# Patient Record
Sex: FEMALE | Race: WHITE | HISPANIC OR LATINO
[De-identification: names, ages, dates, MRNs, and addresses within clinical notes are randomized per-mention and may not be internally consistent; named-entity substitution may affect disease eponyms.]

---

## 2018-08-06 ENCOUNTER — APPOINTMENT (OUTPATIENT)
Dept: PULMONOLOGY | Facility: CLINIC | Age: 63
End: 2018-08-06

## 2019-01-08 ENCOUNTER — APPOINTMENT (OUTPATIENT)
Dept: HEART AND VASCULAR | Facility: CLINIC | Age: 64
End: 2019-01-08

## 2020-05-18 ENCOUNTER — LABORATORY RESULT (OUTPATIENT)
Age: 65
End: 2020-05-18

## 2020-05-18 ENCOUNTER — APPOINTMENT (OUTPATIENT)
Dept: HEART AND VASCULAR | Facility: CLINIC | Age: 65
End: 2020-05-18
Payer: COMMERCIAL

## 2020-05-18 VITALS
TEMPERATURE: 98 F | HEIGHT: 65 IN | BODY MASS INDEX: 29.16 KG/M2 | OXYGEN SATURATION: 98 % | HEART RATE: 72 BPM | DIASTOLIC BLOOD PRESSURE: 85 MMHG | SYSTOLIC BLOOD PRESSURE: 136 MMHG | WEIGHT: 175 LBS | RESPIRATION RATE: 14 BRPM

## 2020-05-18 DIAGNOSIS — Z87.898 PERSONAL HISTORY OF OTHER SPECIFIED CONDITIONS: ICD-10-CM

## 2020-05-18 DIAGNOSIS — I10 ESSENTIAL (PRIMARY) HYPERTENSION: ICD-10-CM

## 2020-05-18 DIAGNOSIS — E03.9 HYPOTHYROIDISM, UNSPECIFIED: ICD-10-CM

## 2020-05-18 DIAGNOSIS — R06.00 DYSPNEA, UNSPECIFIED: ICD-10-CM

## 2020-05-18 PROCEDURE — 93000 ELECTROCARDIOGRAM COMPLETE: CPT

## 2020-05-18 PROCEDURE — 99203 OFFICE O/P NEW LOW 30 MIN: CPT

## 2020-05-19 ENCOUNTER — NON-APPOINTMENT (OUTPATIENT)
Age: 65
End: 2020-05-19

## 2020-05-19 PROBLEM — R06.00 DYSPNEA ON MINIMAL EXERTION: Status: ACTIVE | Noted: 2020-05-19

## 2020-05-19 PROBLEM — I10 BENIGN ESSENTIAL HYPERTENSION: Status: ACTIVE | Noted: 2020-05-19

## 2020-05-19 RX ORDER — MELOXICAM 15 MG/1
15 TABLET ORAL
Qty: 30 | Refills: 0 | Status: ACTIVE | COMMUNITY
Start: 2020-03-19

## 2020-05-19 RX ORDER — LOSARTAN POTASSIUM 50 MG/1
50 TABLET, FILM COATED ORAL
Qty: 30 | Refills: 0 | Status: ACTIVE | COMMUNITY
Start: 2020-03-19

## 2020-05-19 RX ORDER — LEVOTHYROXINE SODIUM 0.05 MG/1
50 TABLET ORAL
Qty: 30 | Refills: 0 | Status: DISCONTINUED | COMMUNITY
Start: 2020-01-27

## 2020-05-19 NOTE — HISTORY OF PRESENT ILLNESS
[FreeTextEntry1] : Patient is 65 year old female with HTN, hypothyroidism who presents for cardiac eval. She reports no active chest pain at rest or with exertion (walking 20 mins or climbing 4 flight of stairs). She does admit to occasional shortness of breath with heavy exertion. In the past she has intermittent episodes of burning in chest at night attributed to heartburn. No dizziness, palpitations. No LOC.

## 2020-05-19 NOTE — PHYSICAL EXAM
[Normal Appearance] : normal appearance [General Appearance - Well Developed] : well developed [Well Groomed] : well groomed [No Deformities] : no deformities [General Appearance - Well Nourished] : well nourished [General Appearance - In No Acute Distress] : no acute distress [Normal Conjunctiva] : the conjunctiva exhibited no abnormalities [Eyelids - No Xanthelasma] : the eyelids demonstrated no xanthelasmas [No Oral Pallor] : no oral pallor [Normal Oral Mucosa] : normal oral mucosa [No Oral Cyanosis] : no oral cyanosis [Normal Jugular Venous V Waves Present] : normal jugular venous V waves present [Normal Jugular Venous A Waves Present] : normal jugular venous A waves present [No Jugular Venous Quesada A Waves] : no jugular venous quesada A waves [Heart Rate And Rhythm] : heart rate and rhythm were normal [Heart Sounds] : normal S1 and S2 [Murmurs] : no murmurs present [Respiration, Rhythm And Depth] : normal respiratory rhythm and effort [Exaggerated Use Of Accessory Muscles For Inspiration] : no accessory muscle use [Auscultation Breath Sounds / Voice Sounds] : lungs were clear to auscultation bilaterally [Abdomen Soft] : soft [Abdomen Tenderness] : non-tender [Abdomen Mass (___ Cm)] : no abdominal mass palpated [Abnormal Walk] : normal gait [Gait - Sufficient For Exercise Testing] : the gait was sufficient for exercise testing [Nail Clubbing] : no clubbing of the fingernails [Cyanosis, Localized] : no localized cyanosis [Petechial Hemorrhages (___cm)] : no petechial hemorrhages [Skin Color & Pigmentation] : normal skin color and pigmentation [] : no rash [No Venous Stasis] : no venous stasis [Skin Lesions] : no skin lesions [No Skin Ulcers] : no skin ulcer [No Xanthoma] : no  xanthoma was observed [Oriented To Time, Place, And Person] : oriented to person, place, and time [Mood] : the mood was normal [Affect] : the affect was normal [No Anxiety] : not feeling anxious

## 2020-05-19 NOTE — DISCUSSION/SUMMARY
[FreeTextEntry1] : Assessment/Plan:\par Patient is 65 year old female with HTN, hypothyroidism who presents for cardiac eval.\par \par -Doing well without active anginal equivalent at rest or exertion. Mild exertional SOB with heavy exertion possibly due to mild weight gain. EKG without ischemic changes. Check ECHO - EF, valvular abn\par \par -HTN: BP controlled. Cont losartan\par \par -Check CBC, CPM, lipids, TSH, Hgba1c\par \par Fu post echo

## 2020-06-07 ENCOUNTER — EMERGENCY (EMERGENCY)
Facility: HOSPITAL | Age: 65
LOS: 1 days | Discharge: HOME | End: 2020-06-07
Attending: EMERGENCY MEDICINE
Payer: COMMERCIAL

## 2020-06-07 VITALS
HEART RATE: 109 BPM | DIASTOLIC BLOOD PRESSURE: 124 MMHG | OXYGEN SATURATION: 97 % | TEMPERATURE: 99 F | HEIGHT: 62 IN | SYSTOLIC BLOOD PRESSURE: 172 MMHG | WEIGHT: 169.98 LBS | RESPIRATION RATE: 19 BRPM

## 2020-06-07 VITALS
OXYGEN SATURATION: 100 % | DIASTOLIC BLOOD PRESSURE: 80 MMHG | RESPIRATION RATE: 17 BRPM | SYSTOLIC BLOOD PRESSURE: 129 MMHG | HEART RATE: 70 BPM

## 2020-06-07 LAB
ALBUMIN SERPL ELPH-MCNC: 4.9 G/DL — SIGNIFICANT CHANGE UP (ref 3.3–5)
ALP SERPL-CCNC: 54 U/L — SIGNIFICANT CHANGE UP (ref 40–120)
ALT FLD-CCNC: 20 U/L — SIGNIFICANT CHANGE UP (ref 10–45)
ANION GAP SERPL CALC-SCNC: 13 MMOL/L — SIGNIFICANT CHANGE UP (ref 5–17)
APPEARANCE UR: CLEAR — SIGNIFICANT CHANGE UP
APTT BLD: 28.4 SEC — SIGNIFICANT CHANGE UP (ref 27.5–36.3)
AST SERPL-CCNC: 20 U/L — SIGNIFICANT CHANGE UP (ref 10–40)
BASOPHILS # BLD AUTO: 0.03 K/UL — SIGNIFICANT CHANGE UP (ref 0–0.2)
BASOPHILS NFR BLD AUTO: 0.4 % — SIGNIFICANT CHANGE UP (ref 0–2)
BILIRUB SERPL-MCNC: 0.3 MG/DL — SIGNIFICANT CHANGE UP (ref 0.2–1.2)
BILIRUB UR-MCNC: NEGATIVE — SIGNIFICANT CHANGE UP
BUN SERPL-MCNC: 16 MG/DL — SIGNIFICANT CHANGE UP (ref 7–23)
CALCIUM SERPL-MCNC: 9.9 MG/DL — SIGNIFICANT CHANGE UP (ref 8.4–10.5)
CHLORIDE SERPL-SCNC: 98 MMOL/L — SIGNIFICANT CHANGE UP (ref 96–108)
CO2 SERPL-SCNC: 23 MMOL/L — SIGNIFICANT CHANGE UP (ref 22–31)
COLOR SPEC: COLORLESS — SIGNIFICANT CHANGE UP
CREAT SERPL-MCNC: 0.76 MG/DL — SIGNIFICANT CHANGE UP (ref 0.5–1.3)
DIFF PNL FLD: NEGATIVE — SIGNIFICANT CHANGE UP
EOSINOPHIL # BLD AUTO: 0.07 K/UL — SIGNIFICANT CHANGE UP (ref 0–0.5)
EOSINOPHIL NFR BLD AUTO: 1 % — SIGNIFICANT CHANGE UP (ref 0–6)
GLUCOSE SERPL-MCNC: 132 MG/DL — HIGH (ref 70–99)
GLUCOSE UR QL: NEGATIVE — SIGNIFICANT CHANGE UP
HCT VFR BLD CALC: 43.1 % — SIGNIFICANT CHANGE UP (ref 34.5–45)
HGB BLD-MCNC: 14.1 G/DL — SIGNIFICANT CHANGE UP (ref 11.5–15.5)
IMM GRANULOCYTES NFR BLD AUTO: 0.7 % — SIGNIFICANT CHANGE UP (ref 0–1.5)
INR BLD: 0.97 RATIO — SIGNIFICANT CHANGE UP (ref 0.88–1.16)
KETONES UR-MCNC: NEGATIVE — SIGNIFICANT CHANGE UP
LEUKOCYTE ESTERASE UR-ACNC: NEGATIVE — SIGNIFICANT CHANGE UP
LYMPHOCYTES # BLD AUTO: 2.17 K/UL — SIGNIFICANT CHANGE UP (ref 1–3.3)
LYMPHOCYTES # BLD AUTO: 31.3 % — SIGNIFICANT CHANGE UP (ref 13–44)
MCHC RBC-ENTMCNC: 30.2 PG — SIGNIFICANT CHANGE UP (ref 27–34)
MCHC RBC-ENTMCNC: 32.7 GM/DL — SIGNIFICANT CHANGE UP (ref 32–36)
MCV RBC AUTO: 92.3 FL — SIGNIFICANT CHANGE UP (ref 80–100)
MONOCYTES # BLD AUTO: 0.62 K/UL — SIGNIFICANT CHANGE UP (ref 0–0.9)
MONOCYTES NFR BLD AUTO: 8.9 % — SIGNIFICANT CHANGE UP (ref 2–14)
NEUTROPHILS # BLD AUTO: 4 K/UL — SIGNIFICANT CHANGE UP (ref 1.8–7.4)
NEUTROPHILS NFR BLD AUTO: 57.7 % — SIGNIFICANT CHANGE UP (ref 43–77)
NITRITE UR-MCNC: NEGATIVE — SIGNIFICANT CHANGE UP
NRBC # BLD: 0 /100 WBCS — SIGNIFICANT CHANGE UP (ref 0–0)
PH UR: 6.5 — SIGNIFICANT CHANGE UP (ref 5–8)
PLATELET # BLD AUTO: 178 K/UL — SIGNIFICANT CHANGE UP (ref 150–400)
POTASSIUM SERPL-MCNC: 3.8 MMOL/L — SIGNIFICANT CHANGE UP (ref 3.5–5.3)
POTASSIUM SERPL-SCNC: 3.8 MMOL/L — SIGNIFICANT CHANGE UP (ref 3.5–5.3)
PROT SERPL-MCNC: 7.9 G/DL — SIGNIFICANT CHANGE UP (ref 6–8.3)
PROT UR-MCNC: NEGATIVE — SIGNIFICANT CHANGE UP
PROTHROM AB SERPL-ACNC: 11.1 SEC — SIGNIFICANT CHANGE UP (ref 10–12.9)
RBC # BLD: 4.67 M/UL — SIGNIFICANT CHANGE UP (ref 3.8–5.2)
RBC # FLD: 13.7 % — SIGNIFICANT CHANGE UP (ref 10.3–14.5)
SARS-COV-2 RNA SPEC QL NAA+PROBE: SIGNIFICANT CHANGE UP
SODIUM SERPL-SCNC: 134 MMOL/L — LOW (ref 135–145)
SP GR SPEC: 1.01 — SIGNIFICANT CHANGE UP (ref 1.01–1.02)
TROPONIN T, HIGH SENSITIVITY RESULT: <6 NG/L — SIGNIFICANT CHANGE UP (ref 0–51)
UROBILINOGEN FLD QL: NEGATIVE — SIGNIFICANT CHANGE UP
WBC # BLD: 6.94 K/UL — SIGNIFICANT CHANGE UP (ref 3.8–10.5)
WBC # FLD AUTO: 6.94 K/UL — SIGNIFICANT CHANGE UP (ref 3.8–10.5)

## 2020-06-07 PROCEDURE — 84484 ASSAY OF TROPONIN QUANT: CPT

## 2020-06-07 PROCEDURE — 70498 CT ANGIOGRAPHY NECK: CPT

## 2020-06-07 PROCEDURE — 70450 CT HEAD/BRAIN W/O DYE: CPT

## 2020-06-07 PROCEDURE — 81003 URINALYSIS AUTO W/O SCOPE: CPT

## 2020-06-07 PROCEDURE — 85610 PROTHROMBIN TIME: CPT

## 2020-06-07 PROCEDURE — 70498 CT ANGIOGRAPHY NECK: CPT | Mod: 26

## 2020-06-07 PROCEDURE — 85730 THROMBOPLASTIN TIME PARTIAL: CPT

## 2020-06-07 PROCEDURE — 70450 CT HEAD/BRAIN W/O DYE: CPT | Mod: 26,59

## 2020-06-07 PROCEDURE — 96375 TX/PRO/DX INJ NEW DRUG ADDON: CPT | Mod: XU

## 2020-06-07 PROCEDURE — 93005 ELECTROCARDIOGRAM TRACING: CPT

## 2020-06-07 PROCEDURE — 70496 CT ANGIOGRAPHY HEAD: CPT

## 2020-06-07 PROCEDURE — 99285 EMERGENCY DEPT VISIT HI MDM: CPT

## 2020-06-07 PROCEDURE — 70496 CT ANGIOGRAPHY HEAD: CPT | Mod: 26

## 2020-06-07 PROCEDURE — 85027 COMPLETE CBC AUTOMATED: CPT

## 2020-06-07 PROCEDURE — 71045 X-RAY EXAM CHEST 1 VIEW: CPT

## 2020-06-07 PROCEDURE — 82962 GLUCOSE BLOOD TEST: CPT

## 2020-06-07 PROCEDURE — 80053 COMPREHEN METABOLIC PANEL: CPT

## 2020-06-07 PROCEDURE — 96374 THER/PROPH/DIAG INJ IV PUSH: CPT | Mod: XU

## 2020-06-07 PROCEDURE — 71045 X-RAY EXAM CHEST 1 VIEW: CPT | Mod: 26,59

## 2020-06-07 PROCEDURE — 99284 EMERGENCY DEPT VISIT MOD MDM: CPT | Mod: 25

## 2020-06-07 RX ORDER — ACETAMINOPHEN 500 MG
975 TABLET ORAL ONCE
Refills: 0 | Status: COMPLETED | OUTPATIENT
Start: 2020-06-07 | End: 2020-06-07

## 2020-06-07 RX ORDER — ONDANSETRON 8 MG/1
4 TABLET, FILM COATED ORAL ONCE
Refills: 0 | Status: COMPLETED | OUTPATIENT
Start: 2020-06-07 | End: 2020-06-07

## 2020-06-07 RX ADMIN — ONDANSETRON 4 MILLIGRAM(S): 8 TABLET, FILM COATED ORAL at 18:00

## 2020-06-07 RX ADMIN — Medication 1 MILLIGRAM(S): at 18:59

## 2020-06-07 RX ADMIN — Medication 975 MILLIGRAM(S): at 18:49

## 2020-06-07 NOTE — ED PROVIDER NOTE - PROGRESS NOTE DETAILS
AK: Patient improving, remembering events earlier in the day, remembering other world events recently, less repetitive. Daughter agrees, comfortable taking patient home. Understands f/u plan.  is home 24/7 with patient to help care for her.

## 2020-06-07 NOTE — ED PROVIDER NOTE - OBJECTIVE STATEMENT
Jonathan Weil, PGY3 - previously healthy 65F p/w confusion. Per her daughter around 1 hour ago she called for help, with apparent complaint of inability to remember recent events such as today's date, and also with difficulty expressing herself. She did not have any weakness, numbness, or gait changes. She was in her normal state of health before this. No recent illnesses. No trauma. Jonathan Weil, PGY3 - previously healthy 65F p/w confusion. Per her daughter around 1 hour ago she called for help, with apparent complaint of inability to remember recent events such as today's date, and also with difficulty expressing herself. She did not have any weakness, numbness, or gait changes. She was in her normal state of health before this. No recent illnesses. No trauma.    Attendinyo female presents with confusion.  pt is with daughter.  after a shower this afternoon pt was amnestic to recent events.  no vomiting.  has mild nausea.  does not know why people are wearing masks.  no slurred speech.

## 2020-06-07 NOTE — ED ADULT NURSE REASSESSMENT NOTE - NS ED NURSE REASSESS COMMENT FT1
report received from FREDY Mtz. pt to CT scan at this time. will complete neuro assessment on return to ED. report received from FREDY Mtz. pt to CT scan at this time. will complete neuro assessment on return to ED. see neuro flowsheet.  ee report received from FREDY Mtz. pt to CT scan at this time. will complete neuro assessment on return to ED. see neuro flowsheet.

## 2020-06-07 NOTE — ED PROVIDER NOTE - CLINICAL SUMMARY MEDICAL DECISION MAKING FREE TEXT BOX
Jonathan Weil, PGY3 - more strongly suggestive of TGA than TIA or CVA. Code stroke activated, obtain NCCT head, but patient was too anxious to tolerate angiography. Neuro in agreement that symptoms not suggestive of a stroke, also favoring TGA in their differential. Plan to provide ativan and try to obtain angiography after, labs sent, dispo depending on symptom progression (i.e resolution vs persistence).

## 2020-06-07 NOTE — ED PROVIDER NOTE - NSFOLLOWUPINSTRUCTIONS_ED_ALL_ED_FT
-Follow-up with Neurology in the next 1-3 days. Call for appointment. Dr. Lovell.   -Return to the Emergency Department for any worsening or concerning symptoms such as fevers, severe pain, trouble breathing, weakness or lightheadedness.

## 2020-06-07 NOTE — ED PROVIDER NOTE - PHYSICAL EXAMINATION
General: A&Ox2, well nourished, no acute distress  HENT: NC/AT. Posterior oropharynx clear. Patent airway  Eyes: PERRL, EOMI  CV: RRR, no m/r/g. 2+ peripheral pulses. Extremities are warm and well perfused.  Respiratory: CTAB no w/r/r. No respiratory distress.  Abdominal: soft, non-distended, non-tender, no rebound, guarding, or rigidity  Neuro: A&Ox2, otherwise intact - CN II-XII intact. Normal strength and sensation throughout all four extremities. Normal FNF. No pronator drift. Negative Romberg. Normal stance and gait.   Skin: no rashes  Psych: anxious and tearful

## 2020-06-07 NOTE — ED ADULT NURSE NOTE - OBJECTIVE STATEMENT
Pt is a 64 y/o female pmhx of Anxiety & HTN presents to the ED after sudden onset of disorientation. Daughter says she spoke to her at 3pm on the phone and everything was fine, then patients  noticed at 1700 after her shower she was disoriented. She "didn't know why people were wearing masks" and was unclear about what day it is. She presents A & O x 3 to person, place & situation but not to time. She is emotional and anxious, able to complete CT due to pt becoming nervous. PERRL 3mm. Able to follow commands. NIHSS 2. Breathing spontaneous & nonlabored. NSR on cardiac monitor. Abdomen soft & nondistended. Strong peripheral pulses noted b/l. Strength 5/5 x 4 extremities. Concern for transient global amnesia as per Neuro Resident MD Arriaza. Pt complains of headache, nausea & some abdominal pain. Will continue to monitor

## 2020-06-07 NOTE — CONSULT NOTE ADULT - SUBJECTIVE AND OBJECTIVE BOX
INCOMPLETE  Assessment:  65y R-handed F with h/o HTN p/w s/o amnesia and distress likely secondary to transient global amnesia.  On exam, she does not recall the month or her age (though she knows her birth date and tries to calculate her birthdate).  She is in distress, constantly asks why she is in the hospital and recalls that people are wearing masks for an important reason, but doesn't recall for what specifically (i.e. doesn't recall ongoing pandemic etc.).  She is acutely tearful and anxious.  She otherwise has no focal neurologic deficit apart from mild gait instability when getting up too quickly which quickly corrects.     LKW: 15:00 06/07/2020  NIHSS: 2 (Did not know month or age)   Baseline MRS: 0  Not a tPA candidate due to rapidly improving deficits and suspicion of less likely deficits to be due to ischemic process.   Not a thrombectomy candidate due to inability to get timely CTA due to severe claustrophobia as well as rapidly improving symptoms and NIHSS <6 and no LVO.     06/07/20 CT head showed: No acute intracranial hemorrhage, mass effect, or midline shift.  06/07/20 CTA H&N:   CTA neck: No hemodynamically significant stenosis by NASCET criteria. No vascular dissection.  CTA brain: No major vessel occlusion or proximal stenosis.     IMPRESSION: TGA possibly secondary to ischemic process vs. possibly focal non-motor seizure without impaired awareness though much less likely.     PLAN:  - Frequent neuro-checks (q4h) VS q4h to ensure PT is improving as TGA tends to improve, though can often take up to 24 hrs.  - If continues to improve can follow up as outpatient with Dr. Lovell and for outpatient MRI brain w/o contrast.   - If does not improve and/or worsens would get repeat CT head and keep for observation and MRI brain w/o contrast in CDU to r/o infarct vs. mass or other permanent finding.   - Start ASA 81 daily and atorvastatin 80 daily as TGA often thought to be in ischemic family of pathologies.   - Permissive HTN up to 220/110 for first 24hrs with gradual normotension.   - Tight glucose control (long-term goal HgbA1c < 6%)  - A1C, lipid panel, EKG, CXR    Dispo: If continues to improve, as no significant findings on CT head or CTA H&N can follow up outpatient with Dr. Lovell. If not, would observe until AM rounds in CDU for MRI brain w/o contras.t     Assessment and plan discussed with the attending, Dr. Nas Arriaza, DO  PGY-2 Neurology Service NEUROLOGY CONSULT   HPI: 65y R-handed mostly Tongan speaking F with h/o HTN who presented on 20 with s/o amnesia and distress.  PT was sitting at home and suddenly started asking her daughter over and over again where she was, who she was, why everyone around her was wearing masks.  She became very frightened and distressed by this as she knew that there was something wrong by the fact that she lacked insight into this, but was unable to understand what was happening.  LKN 15:00 2020.  No other neurologic deficits, complaints or prior such episodes.  On arrival, PT's NIHSS was 2, PT was significantly stressed, anxious and could barely tolerate finishing CT scanner.  She continued to ask why she was in the hospital though she continued to improve and understand why after repeated explanation, but only after prolonged efforts.  CT head non con was unremarkable.      ROS: A 10-system ROS was performed and is negative except for those items noted above.     PMH:  HTN    Home Medications:  BP medications, but does not recall which     ALLERGIES: No Known Allergies    PSH: Knee injections for pain (steroids?)    Social History: Denies tobacco, alcohol or drug use. Works as doctors aid    FH: Father with dementia of unspecified type in older age.     OBJECTIVE  Vital Signs Last 24 Hrs  T(C): 36.7 (2020 21:00), Max: 37.3 (2020 18:50)  T(F): 98.1 (2020 21:00), Max: 99.1 (2020 18:50)  HR: 70 (2020 23:09) (70 - 109)  BP: 129/80 (2020 23:09) (129/80 - 172/124)  BP(mean): --  RR: 17 (2020 23:09) (17 - 23)  SpO2: 100% (2020 23:09) (97% - 100%)  I&O's Summary    PHYSICAL EXAM:  General: Overweight woman, appears stated age, visibly distressed by her symptoms but in otherwise no cardiopulmonary distress.   Cardiac: S1, S2 normal. RRR with regular pulse.  No murmurs, rubs or gallops.  Respiratory: CTA throughout with good air entry.  MSK: No erythema, tenderness or deformities.   Skin: No rashes, lesions or color changes.  Neurologic:  Mental Status: Awake, alert, oriented to person, place, situation, year but not to month.  Does not recall her age but knows her birth date.  Anxious affect and labile mood.  Follows all commands.  Short term memory not in tact.  Recent event memory impaired (unable to explain why were all in masks, even after being told multiple times, though knows it is very important and a global issue).    Language: Speech is clear, fluent with preserved naming, repetition and comprehension.    Cranial Nerves: PERRLA (R = 3mm, L = 3mm). Visual fields intact. EOMI no nystagmus. V1-3 intact to light touch.  No facial asymmetry b/l, full eye closure strength b/l. Hearing grossly normal to conversation.  Symmetric palate elevation in midline.  Head turning & shoulder shrug intact b/l. Tongue midline, normal movements, no atrophy.  Motor: Normal muscle bulk & tone. No noticeable tremor, myoclonus or pronator drift. 5/5 strength throughout  Sensation: Symmetric B/L preserved sensation to light touch, pin prick, position.    Cortical: No extinction on double simultaneous touch and no signs of neglect.   Reflexes: 2/4 throughout.  Plantar Responses are downgoing B/L.   Coordination: Intact rapid-alternating movements. No dysmetria on finger-to-nose or heel-to-shin.  No dysdiadochokinesia  Gait: Normal stance, stride length, touch off, arm swing and tiffanie.   Normal Romberg. No postural instability.   Psychiatric: Anxious mood and affect. Severely claustrophobic of CT scanner      CBC:                        14.1   6.94  )-----------( 178      ( 2020 18:08 )             43.1       CMP:  06-07    134<L>  |  98  |  16  ----------------------------<  132<H>  3.8   |  23  |  0.76    Ca    9.9      2020 18:08  LIVER FUNCTIONS - ( 2020 18:08 )  Alb: 4.9 g/dL / Pro: 7.9 g/dL / ALK PHOS: 54 U/L / ALT: 20 U/L / AST: 20 U/L / GGT: x           COAGS:  PT/INR - ( 2020 18:08 )   PT: 11.1 sec;   INR: 0.97 ratio  PTT - ( 2020 18:08 )  PTT:28.4 sec    UA:  Urinalysis Basic - ( 2020 19:09 )  Color: Colorless / Appearance: Clear / S.012 / pH: x  Gluc: x / Ketone: Negative  / Bili: Negative / Urobili: Negative   Blood: x / Protein: Negative / Nitrite: Negative   Leuk Esterase: Negative / RBC: x / WBC x   Sq Epi: x / Non Sq Epi: x / Bacteria: x    CT Brain Stroke Protocol (20 @ 19:50) IMPRESSION: No acute intracranial hemorrhage, mass effect, or midline shift.  CTA neck: No hemodynamically significant stenosis by NASCET criteria. No vascular dissection.  CTA brain: No major vessel occlusion or proximal stenosis.

## 2020-06-07 NOTE — CONSULT NOTE ADULT - ASSESSMENT
Assessment:  65y R-handed F with h/o HTN p/w s/o amnesia and distress likely secondary to transient global amnesia.  On exam, she does not recall the month or her age (though she knows her birth date and tries to calculate her birthdate).  She is in distress, constantly asks why she is in the hospital and recalls that people are wearing masks for an important reason, but doesn't recall for what specifically (i.e. doesn't recall ongoing pandemic etc.).  She is acutely tearful and anxious.  She otherwise has no focal neurologic deficit apart from mild gait instability when getting up too quickly which quickly corrects.     LKW: 15:00 06/07/2020  NIHSS: 2 (Did not know month or age)   Baseline MRS: 0  Not a tPA candidate due to rapidly improving deficits and suspicion of less likely deficits to be due to ischemic process.   Not a thrombectomy candidate due to inability to get timely CTA due to severe claustrophobia as well as rapidly improving symptoms and NIHSS <6 and no LVO.     06/07/20 CT head showed: No acute intracranial hemorrhage, mass effect, or midline shift.  06/07/20 CTA H&N:   CTA neck: No hemodynamically significant stenosis by NASCET criteria. No vascular dissection.  CTA brain: No major vessel occlusion or proximal stenosis.     IMPRESSION: TGA possibly secondary to ischemic process vs. possibly focal non-motor seizure without impaired awareness though much less likely.     PLAN:  - Frequent neuro-checks (q4h) VS q4h to ensure PT is improving as TGA tends to improve, though can often take up to 24 hrs.  - If continues to improve can follow up as outpatient with Dr. Lovell and for outpatient MRI brain w/o contrast.   - If does not improve and/or worsens would get repeat CT head and keep for observation and MRI brain w/o contrast in CDU to r/o infarct vs. mass or other permanent finding.   - Start ASA 81 daily and atorvastatin 80 daily as TGA often thought to be in ischemic family of pathologies.   - Permissive HTN up to 220/110 for first 24hrs with gradual normotension.   - Tight glucose control (long-term goal HgbA1c < 6%)  - A1C, lipid panel, EKG, CXR    Dispo: If continues to improve, as no significant findings on CT head or CTA H&N can follow up outpatient with Dr. Lovell. If not, would observe until AM rounds in CDU for MRI brain w/o contras.t     Assessment and plan discussed with the attending, Dr. Nas Arriaza, DO  PGY-2 Neurology Service

## 2020-06-07 NOTE — ED PROVIDER NOTE - CARE PROVIDER_API CALL
Kip Lovell  NEUROLOGY  [address], [address]  [place] [address]  Phone: [phone]  Fax: [phone]  Follow Up Time:

## 2020-06-07 NOTE — ED PROVIDER NOTE - PATIENT PORTAL LINK FT
You can access the FollowMyHealth Patient Portal offered by [facility] by registering at the following website: [email or web address]. By joining [facility]’s FollowMyHealth portal, you will also be able to view your health information using other applications (apps) compatible with our system.

## 2020-06-09 ENCOUNTER — APPOINTMENT (OUTPATIENT)
Dept: HEART AND VASCULAR | Facility: CLINIC | Age: 65
End: 2020-06-09
Payer: COMMERCIAL

## 2020-06-09 VITALS
HEIGHT: 65 IN | BODY MASS INDEX: 28.99 KG/M2 | WEIGHT: 174 LBS | OXYGEN SATURATION: 98 % | HEART RATE: 104 BPM | SYSTOLIC BLOOD PRESSURE: 151 MMHG | TEMPERATURE: 98 F | DIASTOLIC BLOOD PRESSURE: 86 MMHG | RESPIRATION RATE: 14 BRPM

## 2020-06-09 VITALS — SYSTOLIC BLOOD PRESSURE: 131 MMHG | HEART RATE: 82 BPM | DIASTOLIC BLOOD PRESSURE: 82 MMHG

## 2020-06-09 DIAGNOSIS — E78.5 HYPERLIPIDEMIA, UNSPECIFIED: ICD-10-CM

## 2020-06-09 DIAGNOSIS — G45.9 TRANSIENT CEREBRAL ISCHEMIC ATTACK, UNSPECIFIED: ICD-10-CM

## 2020-06-09 DIAGNOSIS — R41.3 OTHER AMNESIA: ICD-10-CM

## 2020-06-09 PROBLEM — Z78.9 OTHER SPECIFIED HEALTH STATUS: Chronic | Status: ACTIVE | Noted: 2020-06-07

## 2020-06-09 PROCEDURE — 99214 OFFICE O/P EST MOD 30 MIN: CPT

## 2020-06-09 RX ORDER — ATORVASTATIN CALCIUM 80 MG/1
80 TABLET, FILM COATED ORAL
Qty: 90 | Refills: 1 | Status: ACTIVE | COMMUNITY
Start: 2020-06-09 | End: 1900-01-01

## 2020-06-09 NOTE — HISTORY OF PRESENT ILLNESS
[FreeTextEntry1] : 64 yo F with PMH HTN, hypothyroidism, patient of Dr. España here for walk in visit for evaluation post recent ED visit for episode of amnesia \par \par The patient presented to the emergency department on June 7 2020 complaining of amnesia. She was evaluated by neurology and diagnosed with likely transient global amnesia. No intervention was performed and the patient regained her memory within few hours. \par The patient presents for followup. \par She denies chest pain, shortness of breath, palpitations. She reports feeling fine. She is concerned about her fluctuating blood pressure measurement (reports was high on admission to the ED) \par Today blood pressure is within normal limits on second measurement\par \par ECHO 6/5/2020 wnl

## 2020-06-09 NOTE — ASSESSMENT
[FreeTextEntry1] : 66 yo F with PMH HTN, hypothyroidism, patient of Dr. España here for walk in visit for evaluation post recent ED visit for episode of amnesia \par \par AMNESIA/TIA\par -recommend to follow up with neurology (patient is scheduled for EEG and brain MRI) \par -cont with aspirin 81 mg daily \par -start atorvastatin 80 mg daily\par -will obtain Holter monitor to r/o afib\par -f/u duplex carotids\par \par HTN\par -well controlled today\par -cont with current medications \par -recc to monitor BP at home and call clinic if BP persistently > 140/90 or < 100 systolic \par \par HLD\par -atorvastatin 80 mg daily in the setting of possible CVA/TIA\par -f/u u/s carotids \par \par \par f/u in 2 weeks for holter results and f/u on symptoms, BP measurements \par

## 2020-06-09 NOTE — PHYSICAL EXAM
[General Appearance - Well Developed] : well developed [Normal Appearance] : normal appearance [Well Groomed] : well groomed [General Appearance - Well Nourished] : well nourished [No Deformities] : no deformities [General Appearance - In No Acute Distress] : no acute distress [Normal Conjunctiva] : the conjunctiva exhibited no abnormalities [Eyelids - No Xanthelasma] : the eyelids demonstrated no xanthelasmas [Normal Oral Mucosa] : normal oral mucosa [No Oral Pallor] : no oral pallor [No Oral Cyanosis] : no oral cyanosis [Normal Jugular Venous A Waves Present] : normal jugular venous A waves present [Normal Jugular Venous V Waves Present] : normal jugular venous V waves present [No Jugular Venous Quesada A Waves] : no jugular venous quesada A waves [Heart Rate And Rhythm] : heart rate and rhythm were normal [Heart Sounds] : normal S1 and S2 [Murmurs] : no murmurs present [Respiration, Rhythm And Depth] : normal respiratory rhythm and effort [Exaggerated Use Of Accessory Muscles For Inspiration] : no accessory muscle use [Auscultation Breath Sounds / Voice Sounds] : lungs were clear to auscultation bilaterally [Abdomen Soft] : soft [Abdomen Tenderness] : non-tender [Abdomen Mass (___ Cm)] : no abdominal mass palpated [Abnormal Walk] : normal gait [Gait - Sufficient For Exercise Testing] : the gait was sufficient for exercise testing [Nail Clubbing] : no clubbing of the fingernails [Cyanosis, Localized] : no localized cyanosis [Petechial Hemorrhages (___cm)] : no petechial hemorrhages [Skin Color & Pigmentation] : normal skin color and pigmentation [] : no rash [No Venous Stasis] : no venous stasis [Skin Lesions] : no skin lesions [No Skin Ulcers] : no skin ulcer [No Xanthoma] : no  xanthoma was observed [Affect] : the affect was normal [Oriented To Time, Place, And Person] : oriented to person, place, and time [Mood] : the mood was normal [No Anxiety] : not feeling anxious

## 2020-09-23 ENCOUNTER — APPOINTMENT (OUTPATIENT)
Dept: INTERNAL MEDICINE | Facility: CLINIC | Age: 65
End: 2020-09-23

## 2022-05-27 NOTE — ED PROVIDER NOTE - NO SIGNIFICANT PAST SURGICAL HISTORY
Zyrtec, claritin or Allegra can be added to the Singulair and Flonase    2.5% hydrocortisone ointment twice daily for a few days until redness and dry patch resolves.    Managing Atopic Dermatitis (Eczema)     After bathing, gently pat your skin dry (don’t rub). Apply moisturizer while your skin is still damp.   To manage your symptoms and help reduce the severity and frequency, try these self-care tips:   Caring for your skin  · Use a gentle, fragrance-free cleanser (or soap substitute cleanser) for bathing. Rinse well. Pat skin dry.  · Take warm, not hot, baths or showers. Try to limit them to no more that 10 to 15 minutes.   · Use moisturizer liberally right after you bathe, while your skin is still damp.  · Try not to scratch because it will cause more damage to your skin.   · Topical, over-the-counter hydrocortisone cream may help control mild symptoms.     Controlling your environment  · Stay out of extreme heat or cold.  · Stay out of very humid or very dry air.  · If your home or office air is very dry, use a humidifier.  · Stay away from allergens such as dust that may be in bedding, carpets, plush toys, or rugs.  · Know that pet hair and dander can cause flare-ups.    Seeking medical treatment  Another way to keep symptoms under control is to seek medical treatment. Talk with your healthcare provider about the type of treatment that may work best for you. Your provider may prescribe treatments such as:   · Treatments to put on the skin daily  · Medicines taken by mouth (oral medicines) such as antihistamines, antibiotics, or corticosteroids  · In severe cases, you may need shots (injections) to control the symptoms. You may even need antibiotics if skin infections occur.  Treatments don’t work the same way for every person. So if your symptoms continue or get worse, ask your healthcare provider about other treatments.   Making lifestyle choices  · Manage the stress in your life.  · Wear loose-fitting  cotton clothing that does not bind or rub your skin.  · Don't wear wool or other scratchy fabrics.  · Use fragrance-free products.    Next step  Now that you know more about atopic dermatitis, the next step is up to you. Follow your healthcare provider’s treatment plan and your self-care routine. This will help bring atopic dermatitis under control. If your symptoms persist, be sure to let your health care provider know.   [employer] last reviewed this educational content on 8/1/2019 © 2000-2020 [employer]. All rights reserved. This information is not intended as a substitute for professional medical care. Always follow your healthcare professional's instructions.            <<----- Click to add NO significant Past Surgical History

## 2023-06-02 NOTE — STROKE CODE NOTE - STROKE TEAM ASSESSMENT
07-Jun-2020 17:55
No
Topical Steroids Applications Pregnancy And Lactation Text: Most topical steroids are considered safe to use during pregnancy and lactation.  Any topical steroid applied to the breast or nipple should be washed off before breastfeeding.

## 2024-04-01 NOTE — ED PROVIDER NOTE - CHIEF COMPLAINT
The patient is a 65y Female complaining of
[phone].          -------------------------------------------------------------------------------------------------------------------  Please call our office at [phone] if you have any  of the following symptoms:   Fever of 100.5 or greater  Chills  Shortness of breath  Swelling or pain in one leg    After office hours an answering service is available and will contact a provider for emergencies or if you are experiencing any of the above symptoms.    Patient does express an interest in My Chart.  My Chart log in information explained on the after visit summary printout at the check-out desk.    Tova Herrera RN

## 2024-07-23 ENCOUNTER — APPOINTMENT (OUTPATIENT)
Age: 69
End: 2024-07-23
Payer: COMMERCIAL

## 2024-07-23 VITALS
HEIGHT: 65 IN | DIASTOLIC BLOOD PRESSURE: 83 MMHG | HEART RATE: 68 BPM | RESPIRATION RATE: 14 BRPM | OXYGEN SATURATION: 96 % | WEIGHT: 174 LBS | BODY MASS INDEX: 28.99 KG/M2 | SYSTOLIC BLOOD PRESSURE: 139 MMHG | TEMPERATURE: 97.7 F

## 2024-07-23 DIAGNOSIS — R30.0 DYSURIA: ICD-10-CM

## 2024-07-23 PROCEDURE — 99204 OFFICE O/P NEW MOD 45 MIN: CPT

## 2024-07-23 PROCEDURE — G2211 COMPLEX E/M VISIT ADD ON: CPT | Mod: NC

## 2024-07-23 RX ORDER — ESTRADIOL 0.1 MG/G
0.1 CREAM VAGINAL
Qty: 1 | Refills: 3 | Status: ACTIVE | COMMUNITY
Start: 2024-07-23 | End: 1900-01-01

## 2024-07-23 NOTE — HISTORY OF PRESENT ILLNESS
[FreeTextEntry1] : JEZ SANDERS Feb 4 1955   Language: English Date of First visit: 07/23/2024  Accompanied by: self  Contact info:  Referring Provider/PCP: Dr. Cardona  Fax: [phone]   CC/ Problem List:  dysuria, urinary frequency  =============================================================================== FIRST VISIT / Summary: Very pleasant 69 year old F here for dysuria and urinary frequency x1 month however symptoms getting worse since having surgery at [facility] on 6/10/2024 for left total knee replacement. Had urinary retention post sx and had catheter in place for 24 hrs then able to void but still having incomplete bladder emptying. She is also on pain meds which was switched to tramadol d/t constipation for which she is taking 3 meds (Linzes, MiraLAX, senna). With Tramadol experiences dizziness and constipation, but reports had good BM 7/22/24 Had hx of microhematuria 4 years ago and had workup of CT and cystoscopy o abnormal findings She reports vaginal dryness for many years since after menopause Denies fever, chills, or flank pain ------------------------------------------------------------------------------------------- INTERVAL VISITS:   ===============================================================================   PMH: HTN, hypothyroid  Meds: ASA 81 mg. losartan, synthroid, (mobic, linzess, tramadol, ondansetron, senna, miralax-PRN) All: vicodin-rash, pruritus, lip swelling, seafood FHx: no  malignancies SocHx: non smoker, no Etoh, , 2 children NSVDx2, GI office    PSH: left knee sx 2024, b/l breast augmentation 35 years ago, fibroids 20 years ago   ROS: Review of Systems is as per HPI unless otherwise denoted below   =============================================================================== DATA: LABS (SELECTED):---------------------------------------------------------------------------------------------------     RADS:-------------------------------------------------------------------------------------------------------------------     PATHOLOGY/CYTOLOGY:-------------------------------------------------------------------------------------------     VOIDING STUDIES: ----------------------------------------------------------------------------------------------------  07/23/2024 unable to do PVR complains d/t dizziness from tramadol   STONE STUDIES: (Analysis/LLSA)----------------------------------------------------------------------------------     PROCEDURES: -----------------------------------------------------------------------------------------------       =============================================================================== PHYSICAL EXAM:    FOCUSED: ----------------------------------------------------------------------------------------------------------------     ======================================================================================= DISCUSSION: ======================================================================================= ASSESSMENT and PLAN   1. dysuria -UA/UCx -mildly symptomatic however prefers to wait for abx once UCx results -azo -prescribed Estradiol, SE explained -RTC in 6 months   =======================================================================================  4g Thank you for allowing me to assist in the care of your patient. Should you have any questions please do not hesitate to reach out to me.     Keaton Mir MD                                                        [facility] Physician [facility]   [place] Office: [address], [address] [place] [address] T: [phone] F: [phone]   [place] Office: [address] [place] [address] T: [phone] F: [phone]

## 2024-07-23 NOTE — HISTORY OF PRESENT ILLNESS
[FreeTextEntry1] : JEZ SANDERS Feb 4 1955   Language: English Date of First visit: 07/23/2024  Accompanied by: self  Contact info:  Referring Provider/PCP: Dr. Cardona  Fax: [phone]   CC/ Problem List:  dysuria, urinary frequency  =============================================================================== FIRST VISIT / Summary: Very pleasant 69 year old F here for dysuria and urinary frequency x1 month however symptoms getting worse since having surgery [facility] hospitals on 6[facility]10/2024 for left total knee replacement. Had urinary retention post sx and had catheter in place for 24 hrs then able to void but still having incomplete bladder emptying. She is also on pain meds which was switched to tramadol d/t constipation for which she is taking 3 meds (Linzes, MiraLAX, senna). With Tramadol experiences dizziness and constipation, but reports had good BM 7/22/24 Had hx of microhematuria 4 years ago and had workup of CT and cystoscopy o abnormal findings She reports vaginal dryness for many years since after menopause Denies fever, chills, or flank pain ------------------------------------------------------------------------------------------- INTERVAL VISITS:   ===============================================================================   PMH: HTN, hypothyroid  Meds: ASA 81 mg. losartan, synthroid, (mobic, linzess, tramadol, ondansetron, senna, miralax-PRN) All: vicodin-rash, pruritus, lip swelling, seafood FHx: no  malignancies SocHx: non smoker, no Etoh, , 2 children NSVDx2, GI office    PSH: left knee sx 2024, b/l breast augmentation 35 years ago, fibroids 20 years ago   ROS: Review of Systems is as per HPI unless otherwise denoted below   =============================================================================== DATA: LABS (SELECTED):---------------------------------------------------------------------------------------------------     RADS:-------------------------------------------------------------------------------------------------------------------     PATHOLOGY/CYTOLOGY:-------------------------------------------------------------------------------------------     VOIDING STUDIES: ----------------------------------------------------------------------------------------------------  07/23/2024 unable to do PVR complains d/t dizziness from tramadol   STONE STUDIES: (Analysis/LLSA)----------------------------------------------------------------------------------     PROCEDURES: -----------------------------------------------------------------------------------------------       =============================================================================== PHYSICAL EXAM:    FOCUSED: ----------------------------------------------------------------------------------------------------------------     ======================================================================================= DISCUSSION: ======================================================================================= ASSESSMENT and PLAN   1. dysuria -UA/UCx -mildly symptomatic however prefers to wait for abx once UCx results -azo -prescribed Estradiol, SE explained -RTC in 6 months   =======================================================================================  4g Thank you for allowing me to assist in the care of your patient. Should you have any questions please do not hesitate to reach out to me.     Keaton Mir MD                                                        [facility] Physician [facility]   [place] Office: [address], [address] [place] [address] T: [phone] F: [phone]   [place] Office: [address] [place] [address] T: [phone] F: [phone]

## 2024-08-14 ENCOUNTER — NON-APPOINTMENT (OUTPATIENT)
Age: 69
End: 2024-08-14